# Patient Record
Sex: FEMALE | Race: WHITE | URBAN - METROPOLITAN AREA
[De-identification: names, ages, dates, MRNs, and addresses within clinical notes are randomized per-mention and may not be internally consistent; named-entity substitution may affect disease eponyms.]

---

## 2017-03-17 ENCOUNTER — HISTORICAL (OUTPATIENT)
Dept: LAB | Facility: HOSPITAL | Age: 47
End: 2017-03-17

## 2017-06-27 ENCOUNTER — HISTORICAL (OUTPATIENT)
Dept: LAB | Facility: HOSPITAL | Age: 47
End: 2017-06-27

## 2017-06-27 LAB
ABS NEUT (OLG): 3.52
ALBUMIN SERPL-MCNC: 4 GM/DL (ref 3.4–5)
ALBUMIN/GLOB SERPL: 0.9 RATIO (ref 1.1–2)
ALP SERPL-CCNC: 89 UNIT/L (ref 50–136)
ALT SERPL-CCNC: 17 UNIT/L (ref 12–78)
APPEARANCE, UA: CLEAR
AST SERPL-CCNC: 15 UNIT/L (ref 10–37)
BACTERIA SPEC CULT: ABNORMAL
BASOPHILS # BLD AUTO: 0.07 X10(3)/MCL
BASOPHILS NFR BLD AUTO: 0.9 %
BILIRUB SERPL-MCNC: 0.5 MG/DL (ref 0.2–1)
BILIRUB UR QL STRIP: NEGATIVE
BILIRUBIN DIRECT+TOT PNL SERPL-MCNC: 0.12 MG/DL (ref 0.05–0.2)
BILIRUBIN DIRECT+TOT PNL SERPL-MCNC: 0.38 MG/DL
BUN SERPL-MCNC: 11 MG/DL (ref 7–18)
CALCIUM SERPL-MCNC: 8.9 MG/DL (ref 8.5–10.1)
CHLORIDE SERPL-SCNC: 101 MMOL/L (ref 98–107)
CO2 SERPL-SCNC: 25.4 MMOL/L (ref 21–32)
COLOR UR: YELLOW
CREAT SERPL-MCNC: 0.95 MG/DL (ref 0.55–1.02)
DEPRECATED CALCIDIOL+CALCIFEROL SERPL-MC: 24.48 NG/ML (ref 30–80)
EOSINOPHIL # BLD AUTO: 0.2 X10(3)/MCL
EOSINOPHIL NFR BLD AUTO: 2.7 %
ERYTHROCYTE [DISTWIDTH] IN BLOOD BY AUTOMATED COUNT: 14 %
GLOBULIN SER-MCNC: 4.6 GM/DL (ref 2.4–3.5)
GLUCOSE (UA): NEGATIVE
GLUCOSE SERPL-MCNC: 95 MG/DL (ref 74–106)
HCT VFR BLD AUTO: 43.6 % (ref 34–46)
HGB BLD-MCNC: 14.9 GM/DL (ref 11.3–15.4)
HGB UR QL STRIP: ABNORMAL
IMM GRANULOCYTES # BLD AUTO: 0.01 10*3/UL (ref 0–0.1)
IMM GRANULOCYTES NFR BLD AUTO: 0.1 % (ref 0–1)
KETONES UR QL STRIP: NEGATIVE
LEUKOCYTE ESTERASE UR QL STRIP: NEGATIVE
LYMPHOCYTES # BLD AUTO: 2.84 X10(3)/MCL
LYMPHOCYTES NFR BLD AUTO: 38.3 %
MCH RBC QN AUTO: 32.1 PG (ref 27–33)
MCHC RBC AUTO-ENTMCNC: 34.2 GM/DL (ref 32–35)
MCV RBC AUTO: 94 FL (ref 81–97)
MONOCYTES # BLD AUTO: 0.77 X10(3)/MCL
MONOCYTES NFR BLD AUTO: 10.4 %
NEUTROPHILS # BLD AUTO: 3.52 X10(3)/MCL
NEUTROPHILS NFR BLD AUTO: 47.6 %
NITRITE UR QL STRIP: NEGATIVE
PH UR STRIP: 5.5 [PH] (ref 4.6–8)
PLATELET # BLD AUTO: 252 X10(3)/MCL (ref 151–368)
PMV BLD AUTO: 12 FL
POTASSIUM SERPL-SCNC: 3.6 MMOL/L (ref 3.5–5.1)
PROT SERPL-MCNC: 8.6 GM/DL (ref 6.4–8.2)
PROT UR QL STRIP: ABNORMAL
RBC # BLD AUTO: 4.64 X10(6)/MCL (ref 3.9–5)
RBC #/AREA URNS HPF: ABNORMAL /[HPF]
SODIUM SERPL-SCNC: 138 MMOL/L (ref 136–145)
SP GR UR STRIP: >=1.03 (ref 1–1.03)
SQUAMOUS EPITHELIAL, UA: ABNORMAL
T3FREE SERPL-MCNC: 3.99 PG/ML (ref 2.18–3.98)
T4 SERPL-MCNC: 7.8 MCG/DL (ref 4.7–13.3)
TSH SERPL-ACNC: 8.02 MIU/ML (ref 0.35–3.75)
UROBILINOGEN UR STRIP-ACNC: 0.2
VIT B12 SERPL-MCNC: 594 PG/ML (ref 193–986)
WBC # SPEC AUTO: 7.41 X10(3)/MCL (ref 3.4–9.2)
WBC #/AREA URNS HPF: ABNORMAL /HPF

## 2018-03-07 ENCOUNTER — HOSPITAL ENCOUNTER (OUTPATIENT)
Dept: MEDSURG UNIT | Facility: HOSPITAL | Age: 48
End: 2018-03-08
Attending: INTERNAL MEDICINE | Admitting: INTERNAL MEDICINE

## 2018-03-07 LAB
ABS NEUT (OLG): 7.8 X10(3)/MCL (ref 1.5–6.9)
ALBUMIN SERPL-MCNC: 3.3 GM/DL (ref 3.4–5)
ALBUMIN/GLOB SERPL: 0.7 RATIO
ALP SERPL-CCNC: 89 UNIT/L (ref 30–113)
ALT SERPL-CCNC: 14 UNIT/L (ref 10–45)
AST SERPL-CCNC: 13 UNIT/L (ref 15–37)
BASOPHILS # BLD AUTO: 0 X10(3)/MCL (ref 0–0.1)
BASOPHILS NFR BLD AUTO: 0 % (ref 0–1)
BILIRUB SERPL-MCNC: 0.1 MG/DL (ref 0.1–0.9)
BILIRUBIN DIRECT+TOT PNL SERPL-MCNC: 0 MG/DL
BILIRUBIN DIRECT+TOT PNL SERPL-MCNC: 0.1 MG/DL (ref 0–0.3)
BUN SERPL-MCNC: 14 MG/DL (ref 10–20)
CALCIUM SERPL-MCNC: 9.1 MG/DL (ref 8–10.5)
CHLORIDE SERPL-SCNC: 105 MMOL/L (ref 100–108)
CO2 SERPL-SCNC: 26 MMOL/L (ref 21–35)
CREAT SERPL-MCNC: 0.94 MG/DL (ref 0.7–1.3)
EOSINOPHIL # BLD AUTO: 0 X10(3)/MCL (ref 0–0.6)
EOSINOPHIL NFR BLD AUTO: 0 % (ref 0–5)
ERYTHROCYTE [DISTWIDTH] IN BLOOD BY AUTOMATED COUNT: 14.4 % (ref 11.5–17)
GLOBULIN SER-MCNC: 4.9 GM/DL
GLUCOSE SERPL-MCNC: 136 MG/DL (ref 75–116)
HCO3 UR-SCNC: 25.1 MEQ/L (ref 22–26)
HCT VFR BLD AUTO: 44.6 % (ref 36–48)
HGB BLD-MCNC: 14.6 GM/DL (ref 12–16)
LIPASE SERPL-CCNC: 181 UNIT/L (ref 21–261)
LYMPHOCYTES # BLD AUTO: 2.8 X10(3)/MCL (ref 0.5–4.1)
LYMPHOCYTES NFR BLD AUTO: 24 % (ref 15–40)
MCH RBC QN AUTO: 31 PG (ref 27–34)
MCHC RBC AUTO-ENTMCNC: 33 GM/DL (ref 31–36)
MCV RBC AUTO: 95 FL (ref 80–99)
MONOCYTES # BLD AUTO: 0.9 X10(3)/MCL (ref 0–1.1)
MONOCYTES NFR BLD AUTO: 8 % (ref 4–12)
NEUTROPHILS # BLD AUTO: 7.8 X10(3)/MCL (ref 1.5–6.9)
NEUTROPHILS NFR BLD AUTO: 67 % (ref 43–75)
PCO2 BLDA: 48.6 MMHG (ref 37–43)
PH SMN: 7.32 [PH] (ref 7.35–7.45)
PLATELET # BLD AUTO: 220 X10(3)/MCL (ref 140–400)
PMV BLD AUTO: 11.3 FL (ref 6.8–10)
PO2 BLDA: 65 MMHG (ref 80–100)
POC ALLENS TEST: POSITIVE
POC BE: -1 (ref -2–3)
POC CO2: 27 MMOL/L (ref 22–27)
POC SAMPLESOURCE: ABNORMAL
POC SATURATED O2: 91 MMHG (ref 96–97)
POC SITE: ABNORMAL
POC TREATMENT: ABNORMAL
POTASSIUM SERPL-SCNC: 3.8 MMOL/L (ref 3.6–5.2)
PROT SERPL-MCNC: 8.2 GM/DL (ref 6.4–8.2)
RBC # BLD AUTO: 4.69 X10(6)/MCL (ref 4.2–5.4)
SODIUM SERPL-SCNC: 143 MMOL/L (ref 135–145)
TROPONIN I SERPL-MCNC: <0.017 NG/ML (ref 0–0.06)
WBC # SPEC AUTO: 11.6 X10(3)/MCL (ref 4.5–11.5)

## 2018-03-08 LAB
ABS NEUT (OLG): 10.4 X10(3)/MCL (ref 1.5–6.9)
ALBUMIN SERPL-MCNC: 2.9 GM/DL (ref 3.4–5)
ALBUMIN/GLOB SERPL: 0.6 RATIO
ALP SERPL-CCNC: 83 UNIT/L (ref 30–113)
ALT SERPL-CCNC: 16 UNIT/L (ref 10–45)
AST SERPL-CCNC: 12 UNIT/L (ref 15–37)
BASOPHILS # BLD AUTO: 0 X10(3)/MCL (ref 0–0.1)
BASOPHILS NFR BLD AUTO: 0 % (ref 0–1)
BILIRUB SERPL-MCNC: 0.1 MG/DL (ref 0.1–0.9)
BILIRUBIN DIRECT+TOT PNL SERPL-MCNC: <0.05 MG/DL (ref 0–0.3)
BILIRUBIN DIRECT+TOT PNL SERPL-MCNC: >0.1 MG/DL
BUN SERPL-MCNC: 15 MG/DL (ref 10–20)
CALCIUM SERPL-MCNC: 8.5 MG/DL (ref 8–10.5)
CHLORIDE SERPL-SCNC: 103 MMOL/L (ref 100–108)
CO2 SERPL-SCNC: 24 MMOL/L (ref 21–35)
CREAT SERPL-MCNC: 1.25 MG/DL (ref 0.7–1.3)
CRP SERPL-MCNC: 0.4 MG/DL (ref 0–0.9)
EOSINOPHIL # BLD AUTO: 0 X10(3)/MCL (ref 0–0.6)
EOSINOPHIL NFR BLD AUTO: 0 % (ref 0–5)
ERYTHROCYTE [DISTWIDTH] IN BLOOD BY AUTOMATED COUNT: 14.4 % (ref 11.5–17)
ERYTHROCYTE [SEDIMENTATION RATE] IN BLOOD: 23 MM/HR (ref 0–20)
GLOBULIN SER-MCNC: 4.5 GM/DL
GLUCOSE SERPL-MCNC: 126 MG/DL (ref 75–116)
HCT VFR BLD AUTO: 41.9 % (ref 36–48)
HGB BLD-MCNC: 13.7 GM/DL (ref 12–16)
LACTATE SERPL-SCNC: 2.3 MMOL/L (ref 0.4–2)
LACTATE SERPL-SCNC: 3.2 MMOL/L (ref 0.4–2)
LACTATE SERPL-SCNC: 3.7 MMOL/L (ref 0.4–2)
LACTATE SERPL-SCNC: 4.4 MMOL/L (ref 0.4–2)
LACTATE SERPL-SCNC: 5.7 MMOL/L (ref 0.4–2)
LACTATE SERPL-SCNC: 6 MMOL/L (ref 0.4–2)
LYMPHOCYTES # BLD AUTO: 0.8 X10(3)/MCL (ref 0.5–4.1)
LYMPHOCYTES NFR BLD AUTO: 7.2 % (ref 15–40)
MCH RBC QN AUTO: 31 PG (ref 27–34)
MCHC RBC AUTO-ENTMCNC: 33 GM/DL (ref 31–36)
MCV RBC AUTO: 95 FL (ref 80–99)
MONOCYTES # BLD AUTO: 0.2 X10(3)/MCL (ref 0–1.1)
MONOCYTES NFR BLD AUTO: 2 % (ref 4–12)
NEUTROPHILS # BLD AUTO: 10.4 X10(3)/MCL (ref 1.5–6.9)
NEUTROPHILS NFR BLD AUTO: 91 % (ref 43–75)
PLATELET # BLD AUTO: 219 X10(3)/MCL (ref 140–400)
PMV BLD AUTO: 11.5 FL (ref 6.8–10)
POTASSIUM SERPL-SCNC: 4.7 MMOL/L (ref 3.6–5.2)
PROT SERPL-MCNC: 7.4 GM/DL (ref 6.4–8.2)
RBC # BLD AUTO: 4.39 X10(6)/MCL (ref 4.2–5.4)
SODIUM SERPL-SCNC: 140 MMOL/L (ref 135–145)
WBC # SPEC AUTO: 11.5 X10(3)/MCL (ref 4.5–11.5)

## 2018-03-13 LAB
FINAL CULTURE: NORMAL
FINAL CULTURE: NORMAL

## 2022-04-09 ENCOUNTER — HISTORICAL (OUTPATIENT)
Dept: ADMINISTRATIVE | Facility: HOSPITAL | Age: 52
End: 2022-04-09

## 2022-04-27 VITALS
BODY MASS INDEX: 31.43 KG/M2 | SYSTOLIC BLOOD PRESSURE: 131 MMHG | HEIGHT: 66 IN | WEIGHT: 195.56 LBS | DIASTOLIC BLOOD PRESSURE: 90 MMHG | OXYGEN SATURATION: 96 %

## 2022-04-30 NOTE — ED PROVIDER NOTES
Patient:   Krysten Kurtz             MRN: [id number]            FIN: [id number]               Age:   47 years     Sex:  Female     :  1970   Associated Diagnoses:   Hypoxia; Community acquired pneumonia   Author:   Taqueria Knight MD      Basic Information   Time seen: Date & time 3/7/2018 21:40:00.   History source: Patient.   Arrival mode: Private vehicle.   History limitation: None.   Additional information: Chief Complaint from Nursing Triage Note : Chief Complaint   3/7/2018 21:32 CST       Chief Complaint           tPt c/o nausea and abd pain. she states she was seen here last night for same c/o and cough. Hx of Crohns. No pain meds or nausea meds given on discharge per pt.  (Modified)   3/6/2018 18:39 CST       Chief Complaint           flu like symptoms for past 7 days. also c/o lower abd pain for past 3 days.  Hx of crohns and states it feels like a flare up  .      History of Present Illness   The patient presents with wheezing.  The onset was 1  weeks ago.  The course/duration of symptoms is constant.  The degree at onset was moderate.  The degree at present is moderate.  The exacerbating factor is none.  The relieving factor is none.  Risk factors consist of wheezing.  Prior episodes: occasional.  Therapy today: none.  Associated symptoms: shortness of breath, nausea, vomiting, denies chest pain, denies fever and denies chills.        Review of Systems   Constitutional symptoms:  No fever, no chills.    Skin symptoms:  Negative except as documented in HPI.   Eye symptoms:  Negative except as documented in HPI.   ENMT symptoms:  Negative except as documented in HPI.   Respiratory symptoms:  Shortness of breath, orthopnea.    Cardiovascular symptoms:  Negative except as documented in HPI.   Gastrointestinal symptoms:  Abdominal pain, nausea, vomiting, constipation.    Genitourinary symptoms:  Negative except as documented in HPI.   Musculoskeletal symptoms:  Negative except as  documented in HPI.   Neurologic symptoms:  Negative except as documented in HPI.   Psychiatric symptoms:  No anxiety, no depression.    Endocrine symptoms:  Negative except as documented in HPI.   Hematologic/Lymphatic symptoms:  Negative except as documented in HPI.   Allergy/immunologic symptoms:  Negative except as documented in HPI.             Additional review of systems information: All other systems reviewed and otherwise negative.      Health Status   Allergies:    Allergic Reactions (Selected)  No Known Allergies  No Known Medication Allergies,    Allergies (2) Active Reaction  No Known Allergies None Documented  No Known Medication Allergies None Documented  .   Medications:  (Selected)   Inpatient Medications  Ordered  DuoNeb 0.5 mg-2.5 mg/3 mL inhalation solution: 3 mL, form: Soln, NEB, q15min, Order duration: 3 dose(s), first dose 03/07/18 22:00:00 CST, stop date 03/07/18 22:44:00 CST  GI Cocktail: 45 mL, form: Liquid, Oral, Once, first dose 03/07/18 22:00:00 CST, stop date 03/07/18 22:00:00 CST  Solumedrol 125 mg/ mL: 125 mg, form: Injection, IV Push, Once, first dose 03/07/18 22:00:00 CST, stop date 03/07/18 22:00:00 CST  Zofran 2 mg/mL injectable solution: 4 mg, form: Injection, IV Push, Once, first dose 03/07/18 22:00:00 CST, stop date 03/07/18 22:00:00 CST  Prescriptions  Prescribed  Asacol  mg oral delayed release tablet: See Instructions, 2 tab(s) Oral QAM and 1 Q Evening, # 90 tab(s), 0 Refill(s), Pharmacy: [facility]  Cytomel 5 mcg oral tablet: 5 mcg = 1 tab(s), Oral, Daily, # 30 tab(s), 5 Refill(s), called to pharmacy (Rx)  Medrol Dosepak 4 mg oral tablet: = 1 packet(s), Oral, As Directed, as directed on package labeling, # 21 tab(s), 0 Refill(s), Pharmacy: [facility]  Norco 5 mg-325 mg oral tablet: 2 tab(s), Oral, q4hr, PRN PRN for pain, # 12 tab(s), 0 Refill(s)  Phenergan 25 mg Suppository: 25 mg = 1 supp, NH (rectal), q6hr, PRN PRN as  needed for nausea/vomiting, # 12 supp, 0 Refill(s), Pharmacy: [facility]  QUEtiapine 300 mg oral tablet: See Instructions, 1 1/2 tab(s) Oral Daily, # 45 tab(s), 3 Refill(s), Pharmacy: [facility]  Topamax 50 mg oral tablet: 50 mg = 1 tab(s), Oral, BID, # 60 tab(s), 5 Refill(s), Pharmacy: [facility]  alPRAzolam 1 mg oral tablet: 1 mg = 1 tab(s), Oral, TID, TAKE AS NEEDED ONLY FOR ANXIETY- must last 30 days, # 90 tab(s), 0 Refill(s)  certolizumab 200 mg/mL subcutaneous kit: 200 mg, Subcutaneous, q2wk, # 6 mL, 3 Refill(s), Pharmacy: [facility]  cyanocobalamin 1000 mcg/mL injectable solution: 1,000 mcg, IM, qWeek, # 10 mL, 2 Refill(s), Pharmacy: [facility]  esomeprazole 40 mg oral DR capsule: 40 mg = 1 cap(s), Oral, Daily, # 30 cap(s), 3 Refill(s), Pharmacy: [facility]  fluticasone 50 mcg/inh nasal spray: 100 mcg = 2 spray(s), Nasal, Daily, # 16 gm, 0 Refill(s), Pharmacy: [facility]  gemfibrozil 600 mg oral tablet: 600 mg = 1 tab(s), Oral, BID, # 60 tab(s), 5 Refill(s), Pharmacy: [facility]  levothyroxine 100 mcg (0.1 mg) oral tablet: 100 mcg = 1 tab(s), Oral, Daily, # 30 tab(s), 5 Refill(s), called to pharmacy (Rx)  montelukast 10 mg oral TABLET: 10 mg = 1 tab(s), Oral, Daily, # 30 tab(s), 0 Refill(s), Pharmacy: [facility]  prednisONE 10 mg oral tablet: 10 mg = 1 tab(s), Oral, Daily, # 30 tab(s), 3 Refill(s), Pharmacy: [facility] [place]  rOPINIRole 3 mg oral tablet: 3 mg = 1 tab(s), Oral, Daily, # 30 tab(s), 6 Refill(s), Pharmacy: [facility] [place]  zolpidem 10 mg oral tablet: 10 mg = 1 tab(s), Oral, qPM, must last 30 days, # 30 tab(s), 0 Refill(s), called to pharmacy (Rx)  Documented Medications  Documented  AZITHROMYCIN 500 MG TABLET: 500 mg = 1 tab(s), Oral, Daily.      Past Medical/ Family/ Social History    Medical history:    No active or resolved past medical history items have been selected or recorded., Reviewed as documented in chart.   Surgical history:    Wrist ([id number]) on 4/1/2017 at 46 Years.  Complete resection of colon ([id number]).  Cholecystectomy ([id number]).  Hysterectomy ([id number])., Reviewed as documented in chart.   Family history:    Hypertension.  Mother  Heart attack                                                                                                                                                                                                                            27-APR-2016 23:23:18<$>  Father  , Reviewed as documented in chart.   Social history: Reviewed as documented in chart.      Physical Examination               Vital Signs   Vital Signs   3/7/2018 21:33 CST       Oxygen Therapy            Room air    3/7/2018 21:32 CST       Temperature Temporal Artery               37.1 DegC                             Peripheral Pulse Rate     98 bpm                             Respiratory Rate          18 br/min                             SpO2                      99 %                             Oxygen Therapy            Room air                             Systolic Blood Pressure   136 mmHg                             Diastolic Blood Pressure  94 mmHg  HI    3/6/2018 22:53 CST       Heart Rate Monitored      87 bpm                             Respiratory Rate          17 br/min                             SpO2                      94 %                             Oxygen Therapy            Room air                             Systolic Blood Pressure   109 mmHg                             Diastolic Blood Pressure  74 mmHg                             Mean Arterial Pressure, Cuff              86 mmHg    3/6/2018 21:59 CST       Heart Rate Monitored      85 bpm                             Respiratory Rate          18 br/min                             SpO2                      92 %   LOW                             Oxygen Therapy            Room air                             Systolic Blood Pressure   107 mmHg                             Diastolic Blood Pressure  81 mmHg                             Mean Arterial Pressure, Cuff              90 mmHg    3/6/2018 20:57 CST       Temperature Oral          36.5 DegC                             Temperature Oral (calculated)             97.70 DegF                             Heart Rate Monitored      82 bpm                             SpO2                      95 %                             Oxygen Therapy            Room air                             Systolic Blood Pressure   103 mmHg                             Diastolic Blood Pressure  79 mmHg    3/6/2018 20:02 CST       Heart Rate Monitored      87 bpm                             SpO2                      94 %                             Oxygen Therapy            Room air                             Systolic Blood Pressure   106 mmHg                             Diastolic Blood Pressure  78 mmHg                             Mean Arterial Pressure, Cuff              87 mmHg    3/6/2018 18:59 CST       Heart Rate Monitored      90 bpm                             Respiratory Rate          16 br/min                             SpO2                      94 %    3/6/2018 18:57 CST       Heart Rate Monitored      90 bpm                             Respiratory Rate          16 br/min                             SpO2                      94 %                             Oxygen Therapy            Room air    3/6/2018 18:39 CST       Temperature Oral          36.8 DegC                             Temperature Oral (calculated)             98.24 DegF                             Peripheral Pulse Rate     91 bpm                             Respiratory Rate          16 br/min                             SpO2                      94 %                             Oxygen Therapy            Room air                              Systolic Blood Pressure   111 mmHg                             Diastolic Blood Pressure  89 mmHg  .   Measurements   3/7/2018 21:32 CST       Weight Dosing             88 kg                             Weight Measured           88 kg                             Weight Measured and Calculated in Lbs     194.00 lb                             Weight Estimated          88 kg                             Height/Length Dosing      167 cm                             Height/Length Measured    167 cm                             Height/Length Estimated   167 cm                             Body Mass Index Estimated 31.55 kg/m2                             Body Mass Index Measured  31.55 kg/m2    3/6/2018 18:39 CST       Weight Dosing             87 kg                             Weight Measured and Calculated in Lbs     191.80 lb                             Weight Estimated          87 kg                             Height/Length Dosing      155 cm                             Height/Length Estimated   155 cm                             Body Mass Index Estimated 36.21 kg/m2  .   Basic Oxygen Information   3/7/2018 21:33 CST       Oxygen Therapy            Room air    3/7/2018 21:32 CST       SpO2                      99 %                             Oxygen Therapy            Room air    3/6/2018 22:53 CST       SpO2                      94 %                             Oxygen Therapy            Room air    3/6/2018 21:59 CST       SpO2                      92 %  LOW                             Oxygen Therapy            Room air    3/6/2018 20:57 CST       SpO2                      95 %                             Oxygen Therapy            Room air    3/6/2018 20:02 CST       SpO2                      94 %                             Oxygen Therapy            Room air    3/6/2018 18:59 CST       SpO2                      94 %    3/6/2018 18:57 CST       SpO2                      94 %                             Oxygen  Therapy            Room air    3/6/2018 18:39 CST       SpO2                      94 %                             Oxygen Therapy            Room air  .   General:  Alert, no acute distress.    Skin:  Intact, moist.    Head:  Normocephalic, atraumatic.    Neck:  Supple, trachea midline, no tenderness.    Eye:  Pupils are equal, round and reactive to light, extraocular movements are intact, normal conjunctiva.    Ears, nose, mouth and throat:  Tympanic membranes clear, oral mucosa moist, no pharyngeal erythema or exudate.    Cardiovascular:  Regular rate and rhythm, No murmur, Normal peripheral perfusion, No edema.    Respiratory:  Respirations are non-labored, breath sounds are equal, Symmetrical chest wall expansion, Breath sounds: Bilateral, crackles present, wheezes present (moderate, expiratory wheezes).    Gastrointestinal:  Soft, Nontender, Non distended, Normal bowel sounds.    Neurological:  Alert and oriented to person, place, time, and situation, No focal neurological deficit observed.    Psychiatric:  Cooperative, appropriate mood & affect.       Medical Decision Making   Differential Diagnosis:  Wheezing, chronic obstructive pulmonary disease, pneumonia.    Documents reviewed:  Emergency department nurses' notes.   Electrocardiogram:  Time 3/7/2018 23:00:00, rate 113, No ST-T changes, no ectopy, normal SD & QRS intervals, EP Interp, The Rhythm is sinus tachycardia.  , QT interval Prolonged 510 seconds.    Results review:  Lab results : Lab View   3/7/2018 23:37 CST       Sample ABG                ARTERIAL                             Treatment                 NC                             Site                      Radial Rt                             pH Art                    7.320  LOW                             pCO2 Art                  48.6 mmHg  HI                             pO2 Art                   65.0 mmHg  LOW                             HCO3 Art                  25.1 mEq/L                              CO2 Totl Art              27.0 mmol/L                             O2 Sat Art                91.0 mmHg  LOW                             D base                    -1.0                             Allens                    Positive                             FIO2 ABG                  NC 5L/M %                             O2 Dev/Mode ABG           NC 5L/M    3/7/2018 22:53 CST       Troponin-I                <0.017 ng/mL    3/7/2018 22:01 CST       Sodium Lvl                143 mmol/L                             Potassium Lvl             3.8 mmol/L                             Chloride                  105 mmol/L                             CO2                       26 mmol/L                             Calcium Lvl               9.1 mg/dL                             Glucose Lvl               136 mg/dL  HI                             BUN                       14 mg/dL                             Creatinine                0.94 mg/dL                             eGFR-AA                   >60 mL/min/1.73 m2  NA                             eGFR-NOEMY                  >60 mL/min/1.73 m2  NA                             Bili Total                0.1 mg/dL                             Bili Direct               0.10 mg/dL                             Bili Indirect             0.00 mg/dL  NA                             AST                       13 unit/L  LOW                             ALT                       14 unit/L                             Alk Phos                  89 unit/L                             Total Protein             8.2 gm/dL                             Albumin Lvl               3.3 gm/dL  LOW                             Globulin                  4.90 gm/dL  NA                             A/G Ratio                 0.7 ratio  NA                             Lipase Lvl                181 unit/L                             WBC                       11.6 x10(3)/mcL  HI                             RBC                        4.69 x10(6)/mcL                             Hgb                       14.6 gm/dL                             Hct                       44.6 %                             Platelet                  220 x10(3)/mcL                             MCV                       95 fL                             MCH                       31 pg                             MCHC                      33 gm/dL                             RDW                       14.4 %                             MPV                       11.3 fL  HI                             Abs Neut                  7.8 x10(3)/mcL  HI                             Neutro Auto               67 %                             Lymph Auto                24.00 %                             Mono Auto                 8 %                             Eos Auto                  0 %                             Abs Eos                   0.0 x10(3)/mcL                             Basophil Auto             0 %                             Abs Neutro                7.8 x10(3)/mcL  HI                             Abs Lymph                 2.8 x10(3)/mcL                             Abs Mono                  0.9 x10(3)/mcL                             Abs Baso                  0.0 x10(3)/mcL  .   Chest X-Ray:  Time reported 3/7/2018 22:38:00, interpretation by Emergency Physician, More prominent infiltrate Right Middle Lobe..    Radiology results:  Rad Results (ST)  < 12 hrs   Accession: [id number]  Order: CT Thorax W Contrast  Report Dt/Tm: 03/07/2018 23:48  Report:   CT CHEST WITH CONTRAST:     HISTORY:  47-year-old with dyspnea, low oxygen saturation     COMPARISON: None     PATIENT RADIATION DOSE:  CTDI vol(mGy) 59                                                 DLP(mGycm)  1087      As per PQRS measures, all CT scans at this facility use dose  modulation, iterative reconstruction, and/or weight based dose  adjustment when appropriate to reduce radiation dose to as low  "as  reasonably achievable.     FINDINGS: Serial axial images were obtained of the chest with the  administration of IV contrast.  Both mediastinal and pulmonary  parenchymal windows were obtained. Emphysematous Lung disease in the  lung apices. Right middle lobe infiltrate and atelectasis. Left lower  lobe atelectasis and infiltrate. Right lower lobe infiltrate with  atelectasis. No obvious pulmonary embolism. The mediastinal structures  normal. Right thyroid gland larger than left, suggest ultrasound.     IMPRESSION:     1. Right middle lobe infiltrate with atelectasis consistent right  middle lobe pneumonia.     2. Bilateral lower lobe infiltrates with atelectasis. Consistent with  bilateral lower lobe pneumonia     3. No pulmonary embolism noted     4. Hepatosplenomegaly.     5. Ascending aorta 3.3 cm..     6. Emphysematous lung disease lung apices bilaterally           "As Per PQRS measures, all CT scans at this facility use dose  modulation, iterative reconstruction, and/or weight based dosing when  appropriate to reduce radiation dose to as low as reasonably  achievable"?      .      Reexamination/ Reevaluation   Time: 3/7/2018 22:54:00 .   Notes: Patient has received 3 DuoNeb treatments while in the emergency room.  Currently on room air, her oxygen saturation is 89%.  Chest x-ray has findings of lower lobe pneumonia which appear to be improved from yesterday.  Due to the oxygen saturation, we will obtain a CT chest with contrast for further evaluation and also to rule out a pulmonary embolism..   Time: 3/8/2018 00:24:00 .   Notes: Have tried to get in touch with Dr. Jacobo for admission, but unable to reach.  Will admit patient to the hospitalist service..      Impression and Plan   Diagnosis   COPD exacerbation (BYV47-QK J44.1)   Hypoxia (DHK20-UQ R09.02)   Community acquired pneumonia (FWK92-RV J18.9)      Calls-Consults   -  3/8/2018 00:33:00 , Abel BRADY, Xavier, Internal Medicine, consult, recommends " Solumedrol 40 q8, Levofloxacin, Albuterol..    Plan   Condition: Improved, Stable.    Disposition: Admit time  3/8/2018 00:04:00, Place in Observation Telemetry Unit.    Counseled: Patient, Regarding diagnosis, Regarding diagnostic results, Regarding treatment plan, Regarding prescription, Patient indicated understanding of instructions.

## 2022-04-30 NOTE — H&P
Patient:   Krysten Kurtz             MRN: [id number]            FIN: [id number]               Age:   47 years     Sex:  Female     :  1970   Associated Diagnoses:   Nausea; Abdominal pain; COPD exacerbation; Hypoxia; Community acquired pneumonia; Tobacco user; Hyperlipemia; Hypothyroid   Author:   Emerson Ribeiro MD      Basic Information   Source of history:  Self.    Referral source:  Emergency department.    History limitation:  None.       Chief Complaint   3/7/2018 21:32 CST       tPt c/o nausea and abd pain. she states she was seen here last night for same c/o and cough. Hx of Crohns. No pain meds or nausea meds given on discharge per pt.  (Modified)       History of Present Illness   46yo female presented to the ED c/o nausea, abdo,kimberlyn pain, and wheezing.  She actually presented the day before and was sent home with some Rxs apparently called in as per ED records.  Nonetheless she came back with the same symptoms.  CXR did reveal aright sided pneumonia.  Her WBC did rise some to 11.6 and her lactic acid was elevated at 2.2.  She states that she has been coughing up clear to white sputum and the cooughing may be aggravating her abdominal pain.  She feels as though she is having a crohns flare.  Her CRP is 0.4.  ESR is pending.  She did have some hypoxia with an O2 sat of 89%.  She doies continue to smoke 1PPD.  No apparetn fever/chills.  She does state diarrhea but that never changes as per her crohns. No blood in the stool.  She will be admitted for treatment of her pneumonia and early sepsis.      Review of Systems   Constitutional:  No fever, No chills, No sweats, No weakness, No fatigue.    Eye:  No double vision, No dry eyes, No photophobia.    Ear/Nose/Mouth/Throat:  No ear pain, No nasal congestion, No sore throat.    Respiratory:  Shortness of breath, Cough, Sputum production, Wheezing, No hemoptysis, No pleuritic pain.    Cardiovascular:  No chest pain, No palpitations, No  peripheral edema, No syncope.    Gastrointestinal:  Nausea, Diarrhea, No vomiting, No constipation, No heartburn.         Abdominal pain: All quadrants, The severity is moderate, Characterized as ( Cramping/colicky ).    Genitourinary:  No dysuria, No hematuria, No change in urine stream.    Hematology/Lymphatics:  No anemia, No bruising tendency, No bruise, No hemorrhage, No petechiae.    Endocrine:  No excessive thirst, No polyuria, No cold intolerance.    Immunologic:  No recurrent fevers, No recurrent infections.    Musculoskeletal:  No back pain, No joint pain, No muscle pain, No decreased range of motion.    Integumentary:  No rash, No pruritus, No petechiae, No skin lesion.    Neurologic:  Alert and oriented X4, No abnormal balance, No confusion, No tingling.    Psychiatric:  No anxiety, No depression.       Health Status   Allergies:    Allergies (2) Active Reaction  No Known Allergies None Documented  No Known Medication Allergies None Documented     Current medications:    Medications (15) Active  Scheduled: (11)  cyanocobalamin 1000 mcg/ml Inj  1,000 mcg 1 mL, IM, qWeek  enoxaparin 40mg/0.4ml Inj  40 mg 0.4 mL, Subcutaneous, Daily  gemfibrozil 600 mg Tab  600 mg 1 tab(s), Oral, BID  levofloxacin 750 mg/150 mL  750 mg 150 mL, IV, q24hr  levothyroxine 0.1 mg Tab UD  100 mcg 1 tab(s), Oral, Daily  methylPREDNISolone 40 mg Inj (for Solu Medrol)  40 mg 1 mL, IV Push, [id number]  pantoprazole  Inj + sodium chloride 0.9% 100 mL  40 mg 1 EA, IV Piggyback, Daily  pantoprazole 40 mg EC Tab  40 mg 1 tab(s), Oral, Once  piperacillin-tazobactam  3.375 gm 1 EA, IV Piggyback, q8hr  Template Non-Formulary Med  See Instructions, Oral, AC BID  vancomycin  1,000 mg 1 EA, IV Piggyback, Daily  Continuous: (1)  sodium chloride 0.9% 1,000 mL  1,000 mL, IV, 125 mL/hr  PRN: (3)  albuterol 0.083% Sol 3 mL UD  2.5 mg 3 mL, NEB, q4hr  ketorolac 30 mg/mL Sol  30 mg 1 mL, IV Push, q6hr  promethazine 25 mg/mL Inj  12.5 mg 0.5 mL, IV  Push, q4hr     Problem list:    Active Problems (9)  Anxiety   Crohns disease   GERD (gastroesophageal reflux disease)   Headache disorder   Hyperlipemia   Hypothyroid   Joint pain   Rheumatoid arthritis   Tobacco user         Histories   Past Medical History:    No active or resolved past medical history items have been selected or recorded., anxiety, Crohns, tobacco abuse, GERD, Hyperlipidemia, Hypothyroidism, RA   Family History:    Hypertension.  Mother  Heart attack                                                                                                                                                                                                                            27-APR-2016 23:23:18<$>  Father     Procedure history:    Wrist (SNOMED CT 49449372) on 4/1/2017 at 46 Years.  Complete resection of colon (SNOMED CT 02330461).  Cholecystectomy (SNOMED CT 11108017).  Hysterectomy (SNOMED CT 945797375).   Social History        Social & Psychosocial Habits    Alcohol  05/10/2016  Use: Never    Employment/School  05/10/2016  Status: Unemployed    Description: disabled    Exercise    Comment: as tolerated - 06/08/2016 08:52 - Karen Johnson LPN    Home/Environment  05/10/2016  Lives with: Children, Significant other    Nutrition/Health  06/08/2016  Type of diet: Regular    Sexual  06/08/2016  Sexually active: Yes    Substance Abuse  06/08/2016  Use: Never    Tobacco  06/08/2016  Use: Current every day smoker    Type: Cigarettes    Tobacco use per day: 20    Ready to change: No  .        Physical Examination   General:  Alert and oriented, Mild distress.         Appearance: Well nourished.         Behavior: Appropriate.         Skin: Normal for ethnicity.    Eye:  Pupils are equal, round and reactive to light, Extraocular movements are intact.    HENT:  Normocephalic, Normal hearing, Oral mucosa is moist, No pharyngeal erythema.    Neck:  Supple, Non-tender, No carotid bruit, No jugular venous  distention, No lymphadenopathy, No thyromegaly.    Respiratory:  Breath sounds are equal, No chest wall tenderness, coarse BS to right, few scattered wheezes.    Cardiovascular:  Normal rate, Regular rhythm, No murmur, No gallop, Good pulses equal in all extremities, Normal peripheral perfusion, No edema.    Gastrointestinal:  Soft, Non-distended, Normal bowel sounds, No organomegaly, mild TTP, no rebound or guarding.    Genitourinary:  No costovertebral angle tenderness, No urethral discharge.       Vital Signs (last 24 hrs)_____  Last Charted___________  Temp Oral     36.7 DegC  (MAR 08 08:00)  Heart Rate Peripheral   94 bpm  (MAR 08 02:56)  Resp Rate         18 br/min  (MAR 08 08:00)  SBP      116 mmHg  (MAR 08 08:00)  DBP      82 mmHg  (MAR 08 08:00)  SpO2      94 %  (MAR 08 08:05)  Weight      88 kg  (MAR 07 21:32)  Height      167 cm  (MAR 08 01:00)  BMI      31.55  (MAR 07 21:32)     Measurements from flowsheet : Measurements   3/8/2018 1:30 CST        Weight Dosing             88 kg                             Weight Measured and Calculated in Lbs     194.00 lb                             Height/Length Dosing      167 cm    3/8/2018 1:00 CST        Weight Dosing             88 kg                             Weight Measured and Calculated in Lbs     194.00 lb                             Weight Estimated          88 kg                             Height/Length Measured    167 cm                             Height/Length Estimated   167 cm    3/7/2018 21:32 CST       Weight Dosing             88 kg                             Weight Measured           88 kg                             Weight Measured and Calculated in Lbs     194.00 lb                             Weight Estimated          88 kg                             Height/Length Dosing      167 cm                             Height/Length Measured    167 cm                             Height/Length Estimated   167 cm                             Body Mass  Index Estimated 31.55 kg/m2                             Body Mass Index Measured  31.55 kg/m2     Lymphatics:  No lymphadenopathy neck, axilla, groin.    Musculoskeletal:  Normal range of motion, Normal strength, No tenderness, No swelling, Normal gait.    Integumentary:  Warm, Dry, Pink, Intact, No rash.    Neurologic:  Alert, Oriented, Normal sensory, Normal motor function, No focal deficits, Cranial Nerves II-XII are grossly intact.    Psychiatric:  Cooperative, Appropriate mood & affect, Normal judgment.       Review / Management   Results review:     Labs (Last four charted values)  Glucose              H 126 (MAR 08) H 136 (MAR 07) , All Results   3/8/2018 8:00 CST        CRP                       0.4 mg/dL    3/8/2018 0:26 CST        Lactic Acid Lvl           2.3 mmol/L  CRIT    3/7/2018 23:37 CST       Sample ABG                ARTERIAL                             Treatment                 NC                             Site                      Radial Rt                             pH Art                    7.320  LOW                             pCO2 Art                  48.6 mmHg  HI                             pO2 Art                   65.0 mmHg  LOW                             HCO3 Art                  25.1 mEq/L                             CO2 Totl Art              27.0 mmol/L                             O2 Sat Art                91.0 mmHg  LOW                             D base                    -1.0                             Allens                    Positive                             FIO2 ABG                  NC 5L/M %                             O2 Dev/Mode ABG           NC 5L/M    3/7/2018 22:53 CST       Troponin-I                <0.017 ng/mL    3/7/2018 22:01 CST       WBC                       11.6 x10(3)/mcL  HI                             RBC                       4.69 x10(6)/mcL                             Hgb                       14.6 gm/dL                             Hct                        44.6 %                             Platelet                  220 x10(3)/mcL                             MCV                       95 fL                             MCH                       31 pg                             MCHC                      33 gm/dL                             RDW                       14.4 %                             MPV                       11.3 fL  HI                             Abs Neut                  7.8 x10(3)/mcL  HI                             Neutro Auto               67 %                             Lymph Auto                24.00 %                             Mono Auto                 8 %                             Sodium Lvl                143 mmol/L                             Potassium Lvl             3.8 mmol/L                             Chloride                  105 mmol/L                             CO2                       26 mmol/L                             Calcium Lvl               9.1 mg/dL                             Glucose Lvl               136 mg/dL  HI                             BUN                       14 mg/dL                             Creatinine                0.94 mg/dL                             eGFR-AA                   >60 mL/min/1.73 m2  NA                             eGFR-NOEMY                  >60 mL/min/1.73 m2  NA                             Bili Total                0.1 mg/dL                             Bili Direct               0.10 mg/dL                             AST                       13 unit/L  LOW                             ALT                       14 unit/L                             Alk Phos                  89 unit/L                             Total Protein             8.2 gm/dL                             Albumin Lvl               3.3 gm/dL  LOW                             Globulin                  4.90 gm/dL  NA                             A/G Ratio                 0.7 ratio  NA                             Lipase  Lvl                181 unit/L  .    Radiology results   Rad Results (ST)   Accession: [id number]  Order: XR Chest 2 Views  Report Dt/Tm: 03/08/2018 09:25  Report:   XR Chest 2 Views     History: Elevated Lactic Acid;Other (please specify) wheezing  Prior study: March 7, 2018     The heart is not enlarged. Patchy infiltrate with atelectasis are  noted in the right lower lobe with patchy infiltrates in the left lung  base. There does appear to be reexpansion of atelectasis seen  previously in the right lower lobe. The left lower lobe infiltrates  have improved today. There is no vascular congestion or pleural  effusion.  The bony structures are osteopenic but grossly intact.     IMPRESSION: Bilateral infiltrates showing some reexpansion and  improvement compared to previous study      Accession: [id number]  Order: XR Chest 2 Views  Report Dt/Tm: 03/08/2018 08:51  Report:   XR Chest 2 Views     History: Wheezing   Prior study: March 6, 2018     The heart is not enlarged. An area of infiltrate, fibrosis or  platelike atelectasis is noted in the right middle lobe and right  lower lobe. Patchy interstitial infiltrates are seen in the left lower  lobe as well. There is no pleural effusion or pneumothorax. The bony  structures are unremarkable.     IMPRESSION: Bilateral infiltrates, worse on the right with  consolidation/atelectasis      Accession: [id number]  Order: CT Thorax W Contrast  Report Dt/Tm: 03/07/2018 23:48  Report:   CT CHEST WITH CONTRAST:     HISTORY:  47-year-old with dyspnea, low oxygen saturation     COMPARISON: None     PATIENT RADIATION DOSE:  CTDI vol(mGy) 59                                                 DLP(mGycm)  1087      As per PQRS measures, all CT scans at this facility use dose  modulation, iterative reconstruction, and/or weight based dose  adjustment when appropriate to reduce radiation dose to as low as  reasonably achievable.     FINDINGS: Serial axial images were obtained of the  "chest with the  administration of IV contrast.  Both mediastinal and pulmonary  parenchymal windows were obtained. Emphysematous Lung disease in the  lung apices. Right middle lobe infiltrate and atelectasis. Left lower  lobe atelectasis and infiltrate. Right lower lobe infiltrate with  atelectasis. No obvious pulmonary embolism. The mediastinal structures  normal. Right thyroid gland larger than left, suggest ultrasound.     IMPRESSION:     1. Right middle lobe infiltrate with atelectasis consistent right  middle lobe pneumonia.     2. Bilateral lower lobe infiltrates with atelectasis. Consistent with  bilateral lower lobe pneumonia     3. No pulmonary embolism noted     4. Hepatosplenomegaly.     5. Ascending aorta 3.3 cm..     6. Emphysematous lung disease lung apices bilaterally           "As Per PQRS measures, all CT scans at this facility use dose  modulation, iterative reconstruction, and/or weight based dosing when  appropriate to reduce radiation dose to as low as reasonably  achievable"?         Condition:  Fair.       Impression and Plan   Diagnosis     Nausea (PNED [id number]).     Abdominal pain (PNED [id number]).     COPD exacerbation (CHR50-XX J44.1).     Hypoxia (BAG11-LD R09.02).     Community acquired pneumonia (OAX50-LV J18.9).     Tobacco user (FLS92-VK Z72.0).     Hyperlipemia (WPH69-TR E78.5).     Hypothyroid (AMA95-MW E03.9).     Course:  Improving.    Orders      (Selected)   Inpatient Orders  Ordered  Lovenox: 40 mg, form: Injection, Subcutaneous, Daily, first dose 03/08/18 9:00:00 CST  NS (0.9% Sodium Chloride) Infusion 1,000 mL: 1,000 mL, 1,000 mL, IV, 125 mL/hr, start date 03/08/18 10:37:00 CST  Protonix 40 mg ORAL enteric coated tablet: 40 mg, form: Tab-EC, Oral, Once, first dose 03/08/18 7:13:00 CST, stop date 03/08/18 7:13:00 CST  Protonix IV 40 mg intravenous injection +  100 mL: 40 mg, form: Injection, IV Piggyback, Daily, Infuse over: 30 " minute(s), first dose 18 6:00:00 CST  SOLU-Medrol: 40 mg, form: Injection, IV Push, [id number], first dose 18 2:00:00 CST  Telemetry Monitorin18 1:18:00 CST, Stop date 18 1:18:00 CST, CM Telemetry Monitoring  Zosyn 3.375 gm (for IVPB): 3.375 gm, form: Injection, IV Piggyback, q8hr, Infuse over: 1 hr, first dose 18 9:00:00 CST  levofloxacin IVPB ( Levaquin ): 750 mg, form: Infusion, IV, q24hr, Infuse over: 1.5 hr, first dose 18 2:00:00 CST  vancomycin: 1,000 mg, form: Injection, IV Piggyback, Daily, Infuse over: 100 minute(s), first dose 18 9:00:00 CST  Ordered (In-Lab)  Blood Culture: 18 23:59:00 CST, Stat collect, Blood, Hand L, Stop date 18 0:26:00 CST  Ordered (Scheduled)  CBC w/ Auto Diff: Routine collect, 18 5:00:00 CST, Blood, Once, Stop date 18 5:00:00 CST, Lab Collect, 18 5:00:00 CST  CMP: Routine collect, 18 5:00:00 CST, Blood, Once, Stop date 18 5:00:00 CST, Lab Collect, 18 5:00:00 CST  Lactic Acid: Timed collect, 18 13:59:00 CST, Blood, Stop date 18 13:59:00 CST, Lab Collect.     follow labs  DVT prophylaxis  cultures pending  consider CT abd/pelvis if pain persist or ESR/CRP elevated  IV fluids  bolus 500cc NS now.     Course:  Improving.    Education and Follow-up:       Counseled: Patient, Regarding diagnosis, Regarding treatment, Regarding medications.

## 2022-04-30 NOTE — DISCHARGE SUMMARY
Patient:   Krysten Kurtz             MRN: [id number]            FIN: [id number]               Age:   47 years     Sex:  Female     :  1970   Associated Diagnoses:   Nausea; Abdominal pain; COPD exacerbation; Hypoxia; Community acquired pneumonia; Tobacco user; Hyperlipemia; Hypothyroid   Author:   Emerson Ribeiro MD      Physical Examination   General:  Alert and oriented, No acute distress.         Appearance: Well nourished.         Behavior: Appropriate.         Skin: Normal for ethnicity.    Eye:  Pupils are equal, round and reactive to light, Extraocular movements are intact.    HENT:  Normocephalic, Normal hearing, Oral mucosa is moist, No pharyngeal erythema.    Neck:  Supple, Non-tender, No carotid bruit, No jugular venous distention, No lymphadenopathy, No thyromegaly.    Respiratory:  Breath sounds are equal, No chest wall tenderness, few scattered wheezes and coarse BS to right base.    Cardiovascular:  Normal rate, Regular rhythm, No murmur, No gallop, Good pulses equal in all extremities, Normal peripheral perfusion, No edema.    Gastrointestinal:  Soft, Non-distended, Normal bowel sounds, No organomegaly, mild TTP diffuse, no rebound/guarding.    Genitourinary:  No costovertebral angle tenderness, No urethral discharge.    Vital Signs   3/8/2018 8:05 CST        SpO2                      94 %                             Saturation Probe Site     Ear, right                             Oxygen Therapy            Room air    3/8/2018 8:00 CST        Temperature Oral          36.7 DegC                             Temperature Oral (calculated)             98.06 DegF                             Heart Rate Monitored      103 bpm  HI                             Respiratory Rate          18 br/min                             Systolic Blood Pressure   116 mmHg                             Diastolic Blood Pressure  82 mmHg    3/8/2018 3:00 CST        Temperature Oral          36.6 DegC                              Temperature Oral (calculated)             97.88 DegF                             Heart Rate Monitored      100 bpm                             Respiratory Rate          20 br/min                             SpO2                      93 %  LOW                             Systolic Blood Pressure   112 mmHg                             Diastolic Blood Pressure  78 mmHg    3/8/2018 2:56 CST        Peripheral Pulse Rate     94 bpm                             Respiratory Rate          18 br/min                             SpO2                      87 %  LOW                             Oxygen Therapy            Room air    3/8/2018 1:00 CST        Temperature Oral          36.3 DegC                             Temperature Oral (calculated)             97.34 DegF                             Heart Rate Monitored      97 bpm                             Respiratory Rate          20 br/min                             SpO2                      96 %                             Systolic Blood Pressure   118 mmHg                             Diastolic Blood Pressure  83 mmHg    3/8/2018 0:41 CST        Temperature Temporal Artery               37 DegC                             Heart Rate Monitored      98 bpm                             Respiratory Rate          20 br/min                             SpO2                      90 %  LOW                             Oxygen Therapy            Nasal cannula                             Oxygen Flow Rate          4 L/min                             Systolic Blood Pressure   115 mmHg                             Diastolic Blood Pressure  74 mmHg                             Mean Arterial Pressure, Cuff              88 mmHg    3/7/2018 22:47 CST       SpO2                      93 %  LOW                             Oxygen Therapy            Nasal cannula                             Oxygen Flow Rate          4 L/min    3/7/2018 22:46 CST       Temperature Temporal Artery                37 DegC                             Heart Rate Monitored      112 bpm  HI                             Respiratory Rate          20 br/min                             SpO2                      89 %  LOW                             Oxygen Therapy            Room air                             Systolic Blood Pressure   121 mmHg                             Diastolic Blood Pressure  85 mmHg                             Mean Arterial Pressure, Cuff              97 mmHg    3/7/2018 22:35 CST       Heart Rate Monitored      92 bpm                             Respiratory Rate          18 br/min                             SpO2                      98 %    3/7/2018 22:29 CST       Heart Rate Monitored      92 bpm                             Respiratory Rate          18 br/min                             SpO2                      98 %                             Oxygen Therapy            Room air    3/7/2018 22:11 CST       Heart Rate Monitored      96 bpm                             Respiratory Rate          18 br/min                             SpO2                      98 %    3/7/2018 22:05 CST       Heart Rate Monitored      96 bpm                             Respiratory Rate          18 br/min                             SpO2                      98 %                             Oxygen Therapy            Room air    3/7/2018 21:58 CST       Heart Rate Monitored      96 bpm                             Respiratory Rate          18 br/min                             SpO2                      98 %    3/7/2018 21:50 CST       Heart Rate Monitored      100 bpm                             Respiratory Rate          18 br/min                             SpO2                      98 %                             Oxygen Therapy            Room air    3/7/2018 21:33 CST       Oxygen Therapy            Room air    3/7/2018 21:32 CST       Temperature Temporal Artery               37.1 DegC                              Peripheral Pulse Rate     98 bpm                             Respiratory Rate          18 br/min                             SpO2                      99 %                             Oxygen Therapy            Room air                             Systolic Blood Pressure   136 mmHg                             Diastolic Blood Pressure  94 mmHg  HI        Vital Signs (last 24 hrs)_____  Last Charted___________  Temp Oral     36.7 DegC  (MAR 08 08:00)  Heart Rate Peripheral   94 bpm  (MAR 08 02:56)  Resp Rate         18 br/min  (MAR 08 08:00)  SBP      116 mmHg  (MAR 08 08:00)  DBP      82 mmHg  (MAR 08 08:00)  SpO2      94 %  (MAR 08 08:05)  Weight      88 kg  (MAR 07 21:32)  Height      167 cm  (MAR 08 01:00)  BMI      31.55  (MAR 07 21:32)     Measurements from flowsheet : Measurements   3/8/2018 1:30 CST        Weight Dosing             88 kg                             Weight Measured and Calculated in Lbs     194.00 lb                             Height/Length Dosing      167 cm    3/8/2018 1:00 CST        Weight Dosing             88 kg                             Weight Measured and Calculated in Lbs     194.00 lb                             Weight Estimated          88 kg                             Height/Length Measured    167 cm                             Height/Length Estimated   167 cm    3/7/2018 21:32 CST       Weight Dosing             88 kg                             Weight Measured           88 kg                             Weight Measured and Calculated in Lbs     194.00 lb                             Weight Estimated          88 kg                             Height/Length Dosing      167 cm                             Height/Length Measured    167 cm                             Height/Length Estimated   167 cm                             Body Mass Index Estimated 31.55 kg/m2                             Body Mass Index Measured  31.55 kg/m2     Lymphatics:  No lymphadenopathy neck, axilla, groin.     Musculoskeletal:  Normal range of motion, Normal strength, No tenderness, No swelling, Normal gait.    Integumentary:  Warm, Dry, Pink, Intact, No rash.    Neurologic:  Alert, Oriented, Normal sensory, Normal motor function, No focal deficits, Cranial Nerves II-XII are grossly intact.    Psychiatric:  Cooperative, Appropriate mood & affect, Normal judgment.       Hospital Course   48yo female admitted for pneumonia and possible crohns flare.  Due to her not getting th epain meds that she wants she has decided to leave [place]. Due to her condition and elevated lactic acid she is at increased chance of death if she does not seek medical attention.  She did recieve IV antibiotics during her brief stay.  The last lactic acid was >5.      Discharge Plan   Discharge Summary Plan   Discharge Status: unchanged.     Discharge disposition: discharge to home self care.     Prescriptions: continue same medications.     Diagnosis     Nausea (PNED [id number]).     Abdominal pain (PNED [id number]).     COPD exacerbation (QGJ04-NH J44.1).     Hypoxia (XPD14-LM R09.02).     Community acquired pneumonia (WLK43-DG J18.9).     Tobacco user (FDH62-HR Z72.0).     Hyperlipemia (XRC49-ES E78.5).     Hypothyroid (NMW64-MH E03.9).     Course   Unchanged.     Education and Follow-up   Counseled: patient, regarding diagnosis, regarding treatment, regarding medications.